# Patient Record
(demographics unavailable — no encounter records)

---

## 2024-12-04 NOTE — ASSESSMENT
[FreeTextEntry1] :   It appears that he has a penile plaque distally right underneath the glans penis.  Treatment options were [place] disease was discussed.  Xiaflex injections were discussed.  His curvature is minimal.  PSA level was normal.  We discussed the underlying mechanism for erections, pathophysiology of erectile dysfunction (ED) and treatment options. Role of smoking, diabetes, hypertension, hyperlipidemia, coronary artery disease and treatment for benign and malignant prostate conditions was discussed as some of the more common causes of ED. Exercise, weight loss and a healthy lifestyle can be beneficial. We discussed testosterone (T) and its possible link to increased desire for sexual activity, feeling energetic, muscle mass, etc. Low T as a cause for ED is less clear. Mechanism by which PDE-5 inhibitors improve erections was discussed. Medications in this category include Viagra, Levitra, Staxyn, Cialis and Stendra. As needed versus daily dosing was discussed. Also, use of vacuum erection device, urethral suppository (MUSE), intracavernosal injections (Edex, Trimix, Caverject) and surgical placement of inflatable penile prosthesis were discussed in detail.  At this time, low intensity shockwave therapy is considered investigational.  However studies have shown improvement in treatment of mild to moderate vasculogenic erectile dysfunction by regenerative mechanisms including stem cell stimulation and angiogenesis.  Side effects of each treatment was reviewed and questions were answered.  He can try sildenafil instead of tadalafil and get back to me with his response.  Ultrasound today showed left-sided varicoceles which he will monitor.  Bladimir Bravo MD, FACS [facility]  of Urology [address] [place] [address], [address] [place] [address] Tel: [phone] Fax: [phone]

## 2024-12-04 NOTE — HISTORY OF PRESENT ILLNESS
[FreeTextEntry1] : He is a 60-year-old man who is seen today for initial visit.  He has had on and off left-sided testicular discomfort for several years.  He was told on ultrasound more than 10 years ago was normal.  He also feels a lump underneath the head of penis with slight curvature ventrally.  He is not responding to tadalafil 20 mg any longer.  In March 2023 PSA was 0.68.  In September 2024 urinalysis showed glucose, creatinine was 0.9 and A1c was 6.9.  He is undergoing scrotal ultrasound today.

## 2024-12-04 NOTE — LETTER BODY
[Dear  ___] : Dear  [unfilled], [Consult Letter:] : I had the pleasure of evaluating your patient, [unfilled]. [Consult Closing:] : Thank you very much for allowing me to participate in the care of this patient.  If you have any questions, please do not hesitate to contact me. [FreeTextEntry1] : [facility] [place] [address]  Phone: [phone]

## 2024-12-04 NOTE — PHYSICAL EXAM
[General Appearance - Well Developed] : well developed [General Appearance - Well Nourished] : well nourished [Normal Appearance] : normal appearance [Well Groomed] : well groomed [General Appearance - In No Acute Distress] : no acute distress [Edema] : no peripheral edema [Respiration, Rhythm And Depth] : normal respiratory rhythm and effort [Exaggerated Use Of Accessory Muscles For Inspiration] : no accessory muscle use [Abdomen Soft] : soft [Abdomen Tenderness] : non-tender [Costovertebral Angle Tenderness] : no ~M costovertebral angle tenderness [Urethral Meatus] : meatus normal [Urinary Bladder Findings] : the bladder was normal on palpation [Testes Tenderness] : no tenderness of the testes [Testes Mass (___cm)] : there were no testicular masses [Normal Station and Gait] : the gait and station were normal for the patient's age [] : no rash [No Focal Deficits] : no focal deficits [Oriented To Time, Place, And Person] : oriented to person, place, and time [Affect] : the affect was normal [Mood] : the mood was normal [Not Anxious] : not anxious [Inguinal Lymph Nodes Enlarged Bilaterally] : inguinal [de-identified] : Circumcised, plaque palpated distally near the glans penis

## 2025-05-28 NOTE — ASSESSMENT
[FreeTextEntry1] :  MODERATE TO SEVER SLOPING SNHL HEARING AIDS DISCUSSED FLUOCINOLON PRN AVOID Q TIPS F/U AUDIOLOGY

## 2025-05-28 NOTE — HISTORY OF PRESENT ILLNESS
[de-identified] : HEARING CHANGE X FEW DAYS CLOGGED FEELING AT TIMES EAR ITCHING MEDICAL HX REVIEWED

## 2025-05-28 NOTE — REASON FOR VISIT
[Initial Consultation] : an initial consultation for [FreeTextEntry2] : Bilateral ear itchiness, right ear noises or eco

## 2025-05-28 NOTE — PHYSICAL EXAM
[Normal] : mucosa is normal [Midline] : trachea located in midline position [de-identified] : ECZEMA OF THE CANAL MILD